# Patient Record
Sex: MALE | Race: WHITE | NOT HISPANIC OR LATINO | Employment: UNEMPLOYED | URBAN - METROPOLITAN AREA
[De-identification: names, ages, dates, MRNs, and addresses within clinical notes are randomized per-mention and may not be internally consistent; named-entity substitution may affect disease eponyms.]

---

## 2018-11-02 ENCOUNTER — APPOINTMENT (OUTPATIENT)
Dept: CT IMAGING | Facility: HOSPITAL | Age: 28
End: 2018-11-02

## 2018-11-02 ENCOUNTER — HOSPITAL ENCOUNTER (EMERGENCY)
Facility: HOSPITAL | Age: 28
Discharge: HOME | End: 2018-11-02
Attending: EMERGENCY MEDICINE | Admitting: EMERGENCY MEDICINE

## 2018-11-02 VITALS
DIASTOLIC BLOOD PRESSURE: 100 MMHG | HEIGHT: 63 IN | TEMPERATURE: 97.4 F | WEIGHT: 115 LBS | HEART RATE: 85 BPM | OXYGEN SATURATION: 99 % | BODY MASS INDEX: 20.38 KG/M2 | RESPIRATION RATE: 20 BRPM | SYSTOLIC BLOOD PRESSURE: 155 MMHG

## 2018-11-02 DIAGNOSIS — S00.83XA FACIAL CONTUSION, INITIAL ENCOUNTER: ICD-10-CM
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
## 2018-11-02 PROCEDURE — 70486 CT MAXILLOFACIAL W/O DYE: CPT

## 2018-11-02 PROCEDURE — 70450 CT HEAD/BRAIN W/O DYE: CPT

## 2018-11-02 PROCEDURE — 72125 CT NECK SPINE W/O DYE: CPT

## 2018-11-02 PROCEDURE — 99283 EMERGENCY DEPT VISIT LOW MDM: CPT
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
## 2023-06-07 ENCOUNTER — OFFICE VISIT (OUTPATIENT)
Dept: FAMILY MEDICINE CLINIC | Facility: CLINIC | Age: 33
End: 2023-06-07
Payer: MEDICAID

## 2023-06-07 VITALS
TEMPERATURE: 98.4 F | WEIGHT: 114.6 LBS | HEIGHT: 64 IN | DIASTOLIC BLOOD PRESSURE: 66 MMHG | HEART RATE: 82 BPM | SYSTOLIC BLOOD PRESSURE: 92 MMHG | BODY MASS INDEX: 19.56 KG/M2

## 2023-06-07 DIAGNOSIS — Z86.2 HISTORY OF IMMUNODEFICIENCY: ICD-10-CM

## 2023-06-07 DIAGNOSIS — Z86.39 HISTORY OF HYPERTHYROIDISM: Primary | ICD-10-CM

## 2023-06-07 DIAGNOSIS — Q90.9 DOWN SYNDROME: ICD-10-CM

## 2023-06-07 PROBLEM — M25.559 HIP PAIN: Status: ACTIVE | Noted: 2022-12-15

## 2023-06-07 PROBLEM — Z98.890 HISTORY OF STRABISMUS SURGERY: Status: ACTIVE | Noted: 2023-03-01

## 2023-06-07 PROBLEM — E55.9 VITAMIN D DEFICIENCY: Status: ACTIVE | Noted: 2020-03-09

## 2023-06-07 PROBLEM — R05.1 ACUTE COUGH: Status: ACTIVE | Noted: 2023-02-14

## 2023-06-07 PROBLEM — S63.006A: Status: ACTIVE | Noted: 2021-07-27

## 2023-06-07 PROBLEM — Q65.89 OTHER SPECIFIED CONGENITAL DEFORMITIES OF HIP: Status: ACTIVE | Noted: 2021-07-27

## 2023-06-07 PROBLEM — H50.9 STRABISMUS: Status: ACTIVE | Noted: 2023-03-01

## 2023-06-07 PROBLEM — H50.05 ESOTROPIA, ALTERNATING: Status: ACTIVE | Noted: 2023-03-01

## 2023-06-07 NOTE — PROGRESS NOTES
New Patient Office Visit      Patient Name: Juanito Fernández  : 1990   MRN: [id number]     Chief Complaint:    Chief Complaint   Patient presents with    Thyroid Problem     Establish care.       History of Present Illness: Juanito Fernández is a 33 y.o. male who is here today to establish care.  Patient is here with his mother who helps with history.  Patient has Down syndrome.  Patient also has a history of hypothyroidism.  Most recently he had eye surgery, and in his remote past he has had mouth reconstruction.  He does have allergies to latex and sulfa drugs.  These allergies are mild.  Needs an annual exam later this year.  Of note, mother does report history of immunodeficiency.    Patient enjoys bowling.  Seems to be active doing different activities and has a good support system.      Physical exam: Patient's lung exam CTA bilaterally.  Exam RRR.  No lower extremity edema.  Mood and affect appropriate    Subjective          Past Medical History:   Past Medical History:   Diagnosis Date    Disease of thyroid gland     Down syndrome        Past Surgical History:   Past Surgical History:   Procedure Laterality Date    EYE SURGERY      MOUTH SURGERY      reconstruction       Family History: History reviewed. No pertinent family history.    Social History:   Social History     Socioeconomic History    Marital status: Single   Tobacco Use    Smoking status: Never    Smokeless tobacco: Never   Substance and Sexual Activity    Alcohol use: No    Drug use: No       Medications:     Current Outpatient Medications:     cetirizine (zyrTEC) 10 MG tablet, Take 10 mg by mouth Daily., Disp: , Rfl:     flintstones complete (FLINTSTONES) 60 MG chewable tablet, Chew 1 tablet Daily., Disp: , Rfl:     Allergies:   Allergies   Allergen Reactions    Sulfa Antibiotics Anaphylaxis    Adhesive Tape Other (See Comments)    Latex Other (See Comments) and Unknown (See Comments)     red blisters       Objective     Physical Exam: Please  "see above  Vital Signs:   Vitals:    06/07/23 0847   BP: 92/66   Pulse: 82   Temp: 98.4 °F (36.9 °C)   Weight: 52 kg (114 lb 9.6 oz)   Height: 162 cm (63.78\")     Body mass index is 19.81 kg/m².       Assessment / Plan      Assessment/Plan:   Diagnoses and all orders for this visit:    1. History of hyperthyroidism (Primary)    2. Down syndrome    3. History of immunodeficiency         For patient's hyperthyroidism, we can recheck at his next physical.  Monitor for signs and symptoms going forward.  Immunodeficiency history-will recheck electrophoresis and immunoglobulins at next physical.  Down syndrome-has good support system.  Seems to be staying active doing different things.  We will continue monitor for any social needs.      Follow Up:   Return for Annual.    Semaj Leal DO  [facility] [place]     "

## 2023-10-17 ENCOUNTER — FLU SHOT (OUTPATIENT)
Dept: FAMILY MEDICINE CLINIC | Facility: CLINIC | Age: 33
End: 2023-10-17
Payer: MEDICAID

## 2023-10-17 DIAGNOSIS — Z23 NEED FOR INFLUENZA VACCINATION: Primary | ICD-10-CM

## 2023-10-17 PROCEDURE — 90686 IIV4 VACC NO PRSV 0.5 ML IM: CPT | Performed by: FAMILY MEDICINE

## 2023-10-17 PROCEDURE — 90471 IMMUNIZATION ADMIN: CPT | Performed by: FAMILY MEDICINE

## 2023-11-10 ENCOUNTER — LAB (OUTPATIENT)
Dept: LAB | Facility: HOSPITAL | Age: 33
End: 2023-11-10
Payer: MEDICAID

## 2023-11-10 ENCOUNTER — OFFICE VISIT (OUTPATIENT)
Dept: FAMILY MEDICINE CLINIC | Facility: CLINIC | Age: 33
End: 2023-11-10
Payer: MEDICAID

## 2023-11-10 ENCOUNTER — TELEPHONE (OUTPATIENT)
Dept: FAMILY MEDICINE CLINIC | Facility: CLINIC | Age: 33
End: 2023-11-10

## 2023-11-10 VITALS
BODY MASS INDEX: 21 KG/M2 | DIASTOLIC BLOOD PRESSURE: 64 MMHG | WEIGHT: 123 LBS | HEIGHT: 64 IN | HEART RATE: 65 BPM | OXYGEN SATURATION: 98 % | TEMPERATURE: 98.7 F | SYSTOLIC BLOOD PRESSURE: 100 MMHG

## 2023-11-10 DIAGNOSIS — M21.619 BUNION: ICD-10-CM

## 2023-11-10 DIAGNOSIS — B35.1 ONYCHOMYCOSIS: ICD-10-CM

## 2023-11-10 DIAGNOSIS — Z00.00 ANNUAL PHYSICAL EXAM: Primary | ICD-10-CM

## 2023-11-10 DIAGNOSIS — H61.23 BILATERAL IMPACTED CERUMEN: ICD-10-CM

## 2023-11-10 DIAGNOSIS — Z00.00 ANNUAL PHYSICAL EXAM: ICD-10-CM

## 2023-11-10 LAB
ALBUMIN SERPL-MCNC: 4.5 G/DL (ref 3.5–5.2)
ALBUMIN/GLOB SERPL: 1.9 G/DL
ALP SERPL-CCNC: 69 U/L (ref 39–117)
ALT SERPL W P-5'-P-CCNC: 32 U/L (ref 1–41)
ANION GAP SERPL CALCULATED.3IONS-SCNC: 7 MMOL/L (ref 5–15)
AST SERPL-CCNC: 54 U/L (ref 1–40)
BILIRUB SERPL-MCNC: 0.3 MG/DL (ref 0–1.2)
BUN SERPL-MCNC: 24 MG/DL (ref 6–20)
BUN/CREAT SERPL: 24.7 (ref 7–25)
CALCIUM SPEC-SCNC: 9.5 MG/DL (ref 8.6–10.5)
CHLORIDE SERPL-SCNC: 102 MMOL/L (ref 98–107)
CHOLEST SERPL-MCNC: 197 MG/DL (ref 0–200)
CO2 SERPL-SCNC: 30 MMOL/L (ref 22–29)
CREAT SERPL-MCNC: 0.97 MG/DL (ref 0.76–1.27)
EGFRCR SERPLBLD CKD-EPI 2021: 107 ML/MIN/1.73
GLOBULIN UR ELPH-MCNC: 2.4 GM/DL
GLUCOSE SERPL-MCNC: 89 MG/DL (ref 65–99)
HDLC SERPL-MCNC: 70 MG/DL (ref 40–60)
LDLC SERPL CALC-MCNC: 100 MG/DL (ref 0–100)
LDLC/HDLC SERPL: 1.36 {RATIO}
POTASSIUM SERPL-SCNC: 4.3 MMOL/L (ref 3.5–5.2)
PROT SERPL-MCNC: 6.9 G/DL (ref 6–8.5)
SODIUM SERPL-SCNC: 139 MMOL/L (ref 136–145)
TRIGL SERPL-MCNC: 160 MG/DL (ref 0–150)
TSH SERPL DL<=0.05 MIU/L-ACNC: 4.52 UIU/ML (ref 0.27–4.2)
VLDLC SERPL-MCNC: 27 MG/DL (ref 5–40)

## 2023-11-10 PROCEDURE — 80061 LIPID PANEL: CPT

## 2023-11-10 PROCEDURE — 85025 COMPLETE CBC W/AUTO DIFF WBC: CPT

## 2023-11-10 PROCEDURE — 84443 ASSAY THYROID STIM HORMONE: CPT

## 2023-11-10 PROCEDURE — 80053 COMPREHEN METABOLIC PANEL: CPT

## 2023-11-10 PROCEDURE — 86803 HEPATITIS C AB TEST: CPT

## 2023-11-10 PROCEDURE — G0432 EIA HIV-1/HIV-2 SCREEN: HCPCS

## 2023-11-10 PROCEDURE — 84439 ASSAY OF FREE THYROXINE: CPT

## 2023-11-10 RX ORDER — FLUCONAZOLE 100 MG/1
100 TABLET ORAL DAILY
Qty: 4 TABLET | Refills: 5 | Status: SHIPPED | OUTPATIENT
Start: 2023-11-10 | End: 2023-11-13 | Stop reason: SDUPTHER

## 2023-11-10 NOTE — PROGRESS NOTES
Follow Up Office Visit      Patient Name: Juanito Fernández  : 1990   MRN: [id number]     Chief Complaint:    Chief Complaint   Patient presents with    Annual Exam       History of Present Illness: Juanito Fernández is a 33 y.o. male who is here today to follow up with hx of hyperthyroidism and he has down syndrome.  Patient needs repeat labs today.  Is doing well overall but does have onychomycosis and bunion on both feet.  Is told is not aligned very well.  No issues as far as pain or discomfort.  Patient is here with his mother who helps with some of the history.    No concern for diabetes and no history of diabetes.  Did have a history of thyroid dysfunction so we will need to test that today.  Patient feeling well overall.  He is physically active.  Patient is socially active.  No recent illnesses.  Takes his medication as prescribed.  He eats a healthy diet.  Follows up with a dentist as recommended.    Onychomycosis is a chronic concern an issue for the patient.  Bunion on both feet as well.  Feet are not aligned very well.  Chronic issue as well.      Physical exam: Patient's mood and affect was appropriate neurological exam grossly intact.  Heart exam RRR.  Lung exam CTA bilateral.  Muscle tone appropriate.  Bilateral cerumen impaction noted.        Subjective        I have reviewed and the following portions of the patient's history were updated as appropriate: past family history, past medical history, past social history, past surgical history and problem list.    Medications:     Current Outpatient Medications:     cetirizine (zyrTEC) 10 MG tablet, Take 1 tablet by mouth Daily., Disp: , Rfl:     flintstones complete (FLINTSTONES) 60 MG chewable tablet, Chew 1 tablet Daily., Disp: , Rfl:     fluconazole (Diflucan) 100 MG tablet, Take 1 tablet by mouth Daily., Disp: 4 tablet, Rfl: 5    Allergies:   Allergies   Allergen Reactions    Sulfa Antibiotics Anaphylaxis    Adhesive Tape Other (See Comments)     "Latex Other (See Comments) and Unknown (See Comments)     red blisters       Objective     Physical Exam: Please see above  Vital Signs:   Vitals:    11/10/23 1500   BP: 100/64   Pulse: 65   Temp: 98.7 °F (37.1 °C)   SpO2: 98%   Weight: 55.8 kg (123 lb)   Height: 161.3 cm (63.5\")     Body mass index is 21.45 kg/m².          Assessment / Plan      Assessment/Plan:   Diagnoses and all orders for this visit:    1. Annual physical exam (Primary)  -     CBC & Differential; Future  -     Comprehensive Metabolic Panel; Future  -     Lipid Panel; Future  -     TSH Rfx On Abnormal To Free T4; Future  -     Hepatitis C Antibody; Future  -     HIV-1 / O / 2 Ag / Antibody; Future    2. Bunion  -     Ambulatory Referral to Podiatry    3. Onychomycosis  -     fluconazole (Diflucan) 100 MG tablet; Take 1 tablet by mouth Daily.  Dispense: 4 tablet; Refill: 5    4. Bilateral impacted cerumen  -     Ear Cerumen Removal    For bunions-we will send patient to podiatry.  Patient is not having a lot of pain but in the future he may have rubbing that occurs and may cause a sore.  He may also develop pain.  So the main reason for sending to podiatry is for preventative course of action to help treat his bunions and the abnormal alignment of his toes.    Onychomycosis-we will treat with Diflucan weekly for 6 months.  If no improvement will treat with Lamisil.  Will need liver testing with Lamisil treatment    Cerumen impaction noted on exam.    Annual exam counseling: Constipation regards to diet, exercise, dental care and vision care.  Recommend regular exercise and incorporating resistance training.  Patient has a good regimen and goes to the gym regularly.  Patient reports a healthy diet.  Recommend abundance of whole grains and vegetables.  For Down syndrome recommend monitoring thyroid function and blood sugar yearly.  Watch for any signs of cardiovascular disease.    Age 42 we will start screening for dementia.    Treatment for " chronic condition of onychomycosis and bunion discussed.  Level 4 visit assessed in addition to annual exam    Ear Cerumen Removal    Date/Time: 11/10/2023 4:04 PM    Performed by: Semaj Leal DO  Authorized by: Semaj Leal DO  Consent: Verbal consent obtained. Written consent not obtained.  Risks and benefits: risks, benefits and alternatives were discussed  Consent given by: patient  Patient understanding: patient states understanding of the procedure being performed  Patient identity confirmed: verbally with patient    Anesthesia:  Local Anesthetic: none  Location details: right ear and left ear  Patient tolerance: patient tolerated the procedure well with no immediate complications  Procedure type: irrigation   Sedation:  Patient sedated: no             Follow Up:   Return in about 6 months (around 5/10/2024) for Recheck, Labs today.    Semaj Leal DO  [facility] [place]

## 2023-11-10 NOTE — TELEPHONE ENCOUNTER
Caller: Annie Fernández    Relationship: Mother    Best call back number: [phone]    What is the best time to reach you: ANYTIME    Who are you requesting to speak with (clinical staff, provider,  specific staff member): CLINCAL OR PROVIDER    Do you know the name of the person who called: NA    What was the call regarding: PATIENT MOTHER CALLED AND IS NEEDING CLARIFICATION OF MEDICATION THAT WAS CALL INTO THE PHARMACY FOR PATIENT. FLUCONAZOLE. PLEASE CALL TO DISCUSS SHE THINKS PRESCRIPTION WAS WRITTEN DIFFERENT THEN WAS DISCUSSED IN OFFICE.    Is it okay if the provider responds through MyChart: NO

## 2023-11-11 LAB
BASOPHILS # BLD AUTO: 0.04 10*3/MM3 (ref 0–0.2)
BASOPHILS NFR BLD AUTO: 0.7 % (ref 0–1.5)
DEPRECATED RDW RBC AUTO: 44.5 FL (ref 37–54)
EOSINOPHIL # BLD AUTO: 0.03 10*3/MM3 (ref 0–0.4)
EOSINOPHIL NFR BLD AUTO: 0.5 % (ref 0.3–6.2)
ERYTHROCYTE [DISTWIDTH] IN BLOOD BY AUTOMATED COUNT: 12.8 % (ref 12.3–15.4)
HCT VFR BLD AUTO: 41.1 % (ref 37.5–51)
HCV AB SER DONR QL: NORMAL
HGB BLD-MCNC: 14.4 G/DL (ref 13–17.7)
HIV 1+2 AB+HIV1 P24 AG SERPL QL IA: NORMAL
IMM GRANULOCYTES # BLD AUTO: 0.02 10*3/MM3 (ref 0–0.05)
IMM GRANULOCYTES NFR BLD AUTO: 0.4 % (ref 0–0.5)
LYMPHOCYTES # BLD AUTO: 1.46 10*3/MM3 (ref 0.7–3.1)
LYMPHOCYTES NFR BLD AUTO: 26 % (ref 19.6–45.3)
MCH RBC QN AUTO: 33.1 PG (ref 26.6–33)
MCHC RBC AUTO-ENTMCNC: 35 G/DL (ref 31.5–35.7)
MCV RBC AUTO: 94.5 FL (ref 79–97)
MONOCYTES # BLD AUTO: 0.5 10*3/MM3 (ref 0.1–0.9)
MONOCYTES NFR BLD AUTO: 8.9 % (ref 5–12)
NEUTROPHILS NFR BLD AUTO: 3.57 10*3/MM3 (ref 1.7–7)
NEUTROPHILS NFR BLD AUTO: 63.5 % (ref 42.7–76)
NRBC BLD AUTO-RTO: 0 /100 WBC (ref 0–0.2)
PLATELET # BLD AUTO: 168 10*3/MM3 (ref 140–450)
PMV BLD AUTO: 10.6 FL (ref 6–12)
RBC # BLD AUTO: 4.35 10*6/MM3 (ref 4.14–5.8)
T4 FREE SERPL-MCNC: 1.14 NG/DL (ref 0.93–1.7)
WBC NRBC COR # BLD: 5.62 10*3/MM3 (ref 3.4–10.8)

## 2023-11-13 DIAGNOSIS — B35.1 ONYCHOMYCOSIS: ICD-10-CM

## 2023-11-13 RX ORDER — FLUCONAZOLE 100 MG/1
100 TABLET ORAL WEEKLY
Qty: 4 TABLET | Refills: 5 | Status: SHIPPED | OUTPATIENT
Start: 2023-11-13

## 2023-11-14 ENCOUNTER — TELEPHONE (OUTPATIENT)
Dept: FAMILY MEDICINE CLINIC | Facility: CLINIC | Age: 33
End: 2023-11-14

## 2023-11-14 ENCOUNTER — DOCUMENTATION (OUTPATIENT)
Dept: FAMILY MEDICINE CLINIC | Facility: CLINIC | Age: 33
End: 2023-11-14
Payer: MEDICAID

## 2023-11-14 NOTE — TELEPHONE ENCOUNTER
Caller: Annie Fernández    Relationship to patient: Mother    Best call back number: [phone]     Date of exposure:  11/10/23    Date of positive COVID19 test: 11/14/23    COVID19 symptoms: SLIGHT FEVER, 99.7 RUNNY NOSE    Date of initial quarantine: [id number]    Additional information or concerns: ASKING FOR A PRESCRIPTION OR DO YOU WANT TO SEE HIM?    What is the patients preferred pharmacy: [facility] [id number] - [address] [facility] - [phone] [facility] [phone]  [phone]     CALL [facility], HE HAS A COMPROMISED IMMUNITY  AND DOWN'S SYNDROME.    WOULD LIKE TO KNOW SOMETHING TODAY [phone]

## 2023-11-15 NOTE — PROGRESS NOTES
Both parents called twice this evening to let me know that patient tested positive for COVID today. Today is day 2 of symptoms. He has fever and congestion, as well as poor appetite. He is high risk given that he Downs syndrome. I discussed that I would go ahead and send in Paxlovid this time, but in the future, for any prescription medications, he would need an appt. Risks and benefits discussed. Pt has tolerated this  medication in the past.

## 2024-09-06 ENCOUNTER — LAB (OUTPATIENT)
Dept: INTERNAL MEDICINE | Facility: CLINIC | Age: 34
End: 2024-09-06
Payer: MEDICAID

## 2024-09-06 ENCOUNTER — OFFICE VISIT (OUTPATIENT)
Dept: INTERNAL MEDICINE | Facility: CLINIC | Age: 34
End: 2024-09-06
Payer: MEDICAID

## 2024-09-06 VITALS
HEIGHT: 64 IN | TEMPERATURE: 98.4 F | SYSTOLIC BLOOD PRESSURE: 112 MMHG | WEIGHT: 119 LBS | BODY MASS INDEX: 20.32 KG/M2 | RESPIRATION RATE: 16 BRPM | HEART RATE: 56 BPM | DIASTOLIC BLOOD PRESSURE: 70 MMHG | OXYGEN SATURATION: 99 %

## 2024-09-06 DIAGNOSIS — E03.8 SUBCLINICAL HYPOTHYROIDISM: ICD-10-CM

## 2024-09-06 DIAGNOSIS — Z76.89 ENCOUNTER TO ESTABLISH CARE WITH NEW DOCTOR: Primary | ICD-10-CM

## 2024-09-06 DIAGNOSIS — E05.00 GRAVES DISEASE: Primary | ICD-10-CM

## 2024-09-06 LAB — TSH SERPL DL<=0.05 MIU/L-ACNC: 2.54 UIU/ML (ref 0.27–4.2)

## 2024-09-06 PROCEDURE — 99213 OFFICE O/P EST LOW 20 MIN: CPT | Performed by: INTERNAL MEDICINE

## 2024-09-06 PROCEDURE — 36415 COLL VENOUS BLD VENIPUNCTURE: CPT | Performed by: INTERNAL MEDICINE

## 2024-09-06 PROCEDURE — 84443 ASSAY THYROID STIM HORMONE: CPT | Performed by: INTERNAL MEDICINE

## 2024-09-06 NOTE — PROGRESS NOTES
New Patient Office Visit      Date: 2024  Patient Name: Juanito Fernández  : 1990   MRN: [id number]     Chief Complaint:    Chief Complaint   Patient presents with    Establish Care    Annual Exam    Hip Pain     Hx of hip dysplasia       History of Present Illness: Juanito Fernández is a 34 y.o. male who is here today to establish care and for follow up on down syndrome, subclinical hypothyroidism.  Patient with hip dysplasia, unclear which one.  Worse with walking long distances. Naproxen prn for pain.  Mostly asymptomatic during daily life because he is less active. Previously had physical therapy for it last year.  Has seen ortho in past and waiting on surgery.         Subjective      Review of Systems:   Review of Systems    Past Medical History:   Past Medical History:   Diagnosis Date    Arthritis     Disease of thyroid gland     Down syndrome     Hip dysplasia, congenital     Hyperthyroidism        Past Surgical History:   Past Surgical History:   Procedure Laterality Date    EAR TUBES Bilateral     HAS HAD 5 TIMES    EYE SURGERY      EYE SURGERY      HA D 2 EYE ALIGHMENTS, SECOND WAS     MOUTH SURGERY      reconstruction    MOUTH SURGERY      RECONSTRUCTION OF MOUTH    TONSILECTOMY, ADENOIDECTOMY, BILATERAL MYRINGOTOMY AND TUBES         Family History:   Family History   Problem Relation Age of Onset    Arthritis Mother     Cancer Mother     Hyperlipidemia Mother     Osteoporosis Mother     Cancer Father     Hypertension Father     Arthritis Maternal Grandmother     Hypertension Maternal Grandmother     Diabetes Maternal Grandfather     Kidney disease Maternal Grandfather        Social History:   Social History     Socioeconomic History    Marital status: Single   Tobacco Use    Smoking status: Never     Passive exposure: Never    Smokeless tobacco: Never   Vaping Use    Vaping status: Never Used   Substance and Sexual Activity    Alcohol use: No    Drug use: No    Sexual activity:  "Never       Medications:     Current Outpatient Medications:     cetirizine (zyrTEC) 10 MG tablet, Take 1 tablet by mouth Daily., Disp: , Rfl:     flintstones complete (FLINTSTONES) 60 MG chewable tablet, Chew 1 tablet Daily., Disp: , Rfl:     Allergies:   Allergies   Allergen Reactions    Sulfa Antibiotics Anaphylaxis    Adhesive Tape Other (See Comments)    Latex Other (See Comments) and Unknown (See Comments)     red blisters       Objective     Physical Exam:  Vital Signs:   Vitals:    09/06/24 1452   BP: 112/70   BP Location: Left arm   Patient Position: Sitting   Cuff Size: Adult   Pulse: 56   Resp: 16   Temp: 98.4 °F (36.9 °C)   TempSrc: Tympanic   SpO2: 99%   Weight: 54 kg (119 lb)   Height: 161.3 cm (63.5\")     Body mass index is 20.75 kg/m².   BMI is within normal parameters. No other follow-up for BMI required.       Physical Exam  Constitutional:       General: He is not in acute distress.     Appearance: Normal appearance. He is not ill-appearing.   HENT:      Nose: No congestion or rhinorrhea.      Mouth/Throat:      Mouth: Mucous membranes are moist.      Pharynx: No oropharyngeal exudate.   Eyes:      Extraocular Movements: Extraocular movements intact.      Conjunctiva/sclera: Conjunctivae normal.      Pupils: Pupils are equal, round, and reactive to light.   Cardiovascular:      Rate and Rhythm: Normal rate and regular rhythm.      Heart sounds: No murmur heard.  Pulmonary:      Effort: Pulmonary effort is normal. No respiratory distress.   Abdominal:      General: Abdomen is flat. Bowel sounds are normal.      Palpations: Abdomen is soft.      Tenderness: There is no abdominal tenderness.   Musculoskeletal:      Right lower leg: No edema.      Left lower leg: No edema.   Skin:     General: Skin is warm and dry.      Findings: No rash.   Neurological:      General: No focal deficit present.      Mental Status: He is alert and oriented to person, place, and time. Mental status is at baseline. "   Psychiatric:         Mood and Affect: Mood normal.         Behavior: Behavior normal.         POCT Results (if applicable):   Results for orders placed or performed in visit on 11/10/23   Comprehensive Metabolic Panel    Specimen: Blood   Result Value Ref Range    Glucose 89 65 - 99 mg/dL    BUN 24 (H) 6 - 20 mg/dL    Creatinine 0.97 0.76 - 1.27 mg/dL    Sodium 139 136 - 145 mmol/L    Potassium 4.3 3.5 - 5.2 mmol/L    Chloride 102 98 - 107 mmol/L    CO2 30.0 (H) 22.0 - 29.0 mmol/L    Calcium 9.5 8.6 - 10.5 mg/dL    Total Protein 6.9 6.0 - 8.5 g/dL    Albumin 4.5 3.5 - 5.2 g/dL    ALT (SGPT) 32 1 - 41 U/L    AST (SGOT) 54 (H) 1 - 40 U/L    Alkaline Phosphatase 69 39 - 117 U/L    Total Bilirubin 0.3 0.0 - 1.2 mg/dL    Globulin 2.4 gm/dL    A/G Ratio 1.9 g/dL    BUN/Creatinine Ratio 24.7 7.0 - 25.0    Anion Gap 7.0 5.0 - 15.0 mmol/L    eGFR 107.0 >60.0 mL/min/1.73   Lipid Panel    Specimen: Blood   Result Value Ref Range    Total Cholesterol 197 0 - 200 mg/dL    Triglycerides 160 (H) 0 - 150 mg/dL    HDL Cholesterol 70 (H) 40 - 60 mg/dL    LDL Cholesterol  100 0 - 100 mg/dL    VLDL Cholesterol 27 5 - 40 mg/dL    LDL/HDL Ratio 1.36    TSH Rfx On Abnormal To Free T4    Specimen: Blood   Result Value Ref Range    TSH 4.520 (H) 0.270 - 4.200 uIU/mL   Hepatitis C Antibody    Specimen: Blood   Result Value Ref Range    Hepatitis C Ab Non-Reactive Non-Reactive   HIV-1 / O / 2 Ag / Antibody    Specimen: Blood   Result Value Ref Range    HIV DUO Non-Reactive Non-Reactive   CBC Auto Differential    Specimen: Blood   Result Value Ref Range    WBC 5.62 3.40 - 10.80 10*3/mm3    RBC 4.35 4.14 - 5.80 10*6/mm3    Hemoglobin 14.4 13.0 - 17.7 g/dL    Hematocrit 41.1 37.5 - 51.0 %    MCV 94.5 79.0 - 97.0 fL    MCH 33.1 (H) 26.6 - 33.0 pg    MCHC 35.0 31.5 - 35.7 g/dL    RDW 12.8 12.3 - 15.4 %    RDW-SD 44.5 37.0 - 54.0 fl    MPV 10.6 6.0 - 12.0 fL    Platelets 168 140 - 450 10*3/mm3    Neutrophil % 63.5 42.7 - 76.0 %    Lymphocyte %  26.0 19.6 - 45.3 %    Monocyte % 8.9 5.0 - 12.0 %    Eosinophil % 0.5 0.3 - 6.2 %    Basophil % 0.7 0.0 - 1.5 %    Immature Grans % 0.4 0.0 - 0.5 %    Neutrophils, Absolute 3.57 1.70 - 7.00 10*3/mm3    Lymphocytes, Absolute 1.46 0.70 - 3.10 10*3/mm3    Monocytes, Absolute 0.50 0.10 - 0.90 10*3/mm3    Eosinophils, Absolute 0.03 0.00 - 0.40 10*3/mm3    Basophils, Absolute 0.04 0.00 - 0.20 10*3/mm3    Immature Grans, Absolute 0.02 0.00 - 0.05 10*3/mm3    nRBC 0.0 0.0 - 0.2 /100 WBC   T4, Free    Specimen: Blood   Result Value Ref Range    Free T4 1.14 0.93 - 1.70 ng/dL       Measures:   Advanced Care Planning:   NA    Smoking Cessation:   NA    BMI is within normal parameters. No other follow-up for BMI required.       Assessment / Plan      Assessment/Plan:   Diagnoses and all orders for this visit:    1. Encounter to establish care with new doctor (Primary)    2. Subclinical hypothyroidism  -     TSH Rfx On Abnormal To Free T4    Thyroid peroxidase antibody positive in 2017 and 2018.  Possibly with Hashimoto's disease        Vaccine Counseling:      Follow Up:   No follow-ups on file.    At [facility], we believe that sharing information builds trust and better relationships. You are receiving this note because you recently visited [facility]. It is possible you will see health information before a provider has talked with you about it. This kind of information can be easy to misunderstand. To help you fully understand what it means for your health, we urge you to discuss this note with your provider.    Cuba Hwang DO    [facility]

## 2024-11-05 ENCOUNTER — FLU SHOT (OUTPATIENT)
Dept: INTERNAL MEDICINE | Facility: CLINIC | Age: 34
End: 2024-11-05
Payer: MEDICAID

## 2024-11-05 DIAGNOSIS — Z23 NEED FOR INFLUENZA VACCINATION: Primary | ICD-10-CM

## 2024-11-05 PROCEDURE — 90471 IMMUNIZATION ADMIN: CPT | Performed by: INTERNAL MEDICINE

## 2024-11-05 PROCEDURE — 90656 IIV3 VACC NO PRSV 0.5 ML IM: CPT | Performed by: INTERNAL MEDICINE

## 2025-06-26 ENCOUNTER — OFFICE VISIT (OUTPATIENT)
Dept: INTERNAL MEDICINE | Age: 35
End: 2025-06-26
Payer: MEDICAID

## 2025-06-26 ENCOUNTER — LAB (OUTPATIENT)
Dept: INTERNAL MEDICINE | Age: 35
End: 2025-06-26
Payer: MEDICAID

## 2025-06-26 VITALS
HEART RATE: 63 BPM | OXYGEN SATURATION: 97 % | WEIGHT: 121.8 LBS | HEIGHT: 64 IN | SYSTOLIC BLOOD PRESSURE: 98 MMHG | TEMPERATURE: 96.9 F | BODY MASS INDEX: 20.79 KG/M2 | DIASTOLIC BLOOD PRESSURE: 64 MMHG | RESPIRATION RATE: 16 BRPM

## 2025-06-26 DIAGNOSIS — Z00.00 HEALTHCARE MAINTENANCE: Primary | ICD-10-CM

## 2025-06-26 DIAGNOSIS — Z00.00 ANNUAL PHYSICAL EXAM: ICD-10-CM

## 2025-06-26 DIAGNOSIS — Z12.5 SCREENING FOR PROSTATE CANCER: ICD-10-CM

## 2025-06-26 DIAGNOSIS — Q65.89 CONGENITAL DYSPLASIA OF RIGHT HIP: Primary | ICD-10-CM

## 2025-06-26 DIAGNOSIS — E55.9 VITAMIN D DEFICIENCY: ICD-10-CM

## 2025-06-26 LAB
ALBUMIN SERPL-MCNC: 4.2 G/DL (ref 3.5–5.2)
ALBUMIN/GLOB SERPL: 1.4 G/DL
ALP SERPL-CCNC: 73 U/L (ref 39–117)
ALT SERPL W P-5'-P-CCNC: 40 U/L (ref 1–41)
ANION GAP SERPL CALCULATED.3IONS-SCNC: 8.9 MMOL/L (ref 5–15)
AST SERPL-CCNC: 66 U/L (ref 1–40)
BASOPHILS # BLD AUTO: 0.05 10*3/MM3 (ref 0–0.2)
BASOPHILS NFR BLD AUTO: 1 % (ref 0–1.5)
BILIRUB SERPL-MCNC: 0.3 MG/DL (ref 0–1.2)
BUN SERPL-MCNC: 23 MG/DL (ref 6–20)
BUN/CREAT SERPL: 22.8 (ref 7–25)
CALCIUM SPEC-SCNC: 9.3 MG/DL (ref 8.6–10.5)
CHLORIDE SERPL-SCNC: 105 MMOL/L (ref 98–107)
CHOLEST SERPL-MCNC: 183 MG/DL (ref 0–200)
CO2 SERPL-SCNC: 27.1 MMOL/L (ref 22–29)
CREAT SERPL-MCNC: 1.01 MG/DL (ref 0.76–1.27)
DEPRECATED RDW RBC AUTO: 45.7 FL (ref 37–54)
EGFRCR SERPLBLD CKD-EPI 2021: 100.7 ML/MIN/1.73
EOSINOPHIL # BLD AUTO: 0.03 10*3/MM3 (ref 0–0.4)
EOSINOPHIL NFR BLD AUTO: 0.6 % (ref 0.3–6.2)
ERYTHROCYTE [DISTWIDTH] IN BLOOD BY AUTOMATED COUNT: 12.7 % (ref 12.3–15.4)
GLOBULIN UR ELPH-MCNC: 3 GM/DL
GLUCOSE SERPL-MCNC: 88 MG/DL (ref 65–99)
HBA1C MFR BLD: 5.3 % (ref 4.8–5.6)
HCT VFR BLD AUTO: 41.9 % (ref 37.5–51)
HDLC SERPL-MCNC: 68 MG/DL (ref 40–60)
HGB BLD-MCNC: 14.3 G/DL (ref 13–17.7)
IMM GRANULOCYTES # BLD AUTO: 0.01 10*3/MM3 (ref 0–0.05)
IMM GRANULOCYTES NFR BLD AUTO: 0.2 % (ref 0–0.5)
LDLC SERPL CALC-MCNC: 102 MG/DL (ref 0–100)
LDLC/HDLC SERPL: 1.49 {RATIO}
LYMPHOCYTES # BLD AUTO: 1.56 10*3/MM3 (ref 0.7–3.1)
LYMPHOCYTES NFR BLD AUTO: 30.4 % (ref 19.6–45.3)
MCH RBC QN AUTO: 32.9 PG (ref 26.6–33)
MCHC RBC AUTO-ENTMCNC: 34.1 G/DL (ref 31.5–35.7)
MCV RBC AUTO: 96.5 FL (ref 79–97)
MONOCYTES # BLD AUTO: 0.57 10*3/MM3 (ref 0.1–0.9)
MONOCYTES NFR BLD AUTO: 11.1 % (ref 5–12)
NEUTROPHILS NFR BLD AUTO: 2.92 10*3/MM3 (ref 1.7–7)
NEUTROPHILS NFR BLD AUTO: 56.7 % (ref 42.7–76)
NRBC BLD AUTO-RTO: 0 /100 WBC (ref 0–0.2)
PLATELET # BLD AUTO: 160 10*3/MM3 (ref 140–450)
PMV BLD AUTO: 10.2 FL (ref 6–12)
POTASSIUM SERPL-SCNC: 4.6 MMOL/L (ref 3.5–5.2)
PROT SERPL-MCNC: 7.2 G/DL (ref 6–8.5)
RBC # BLD AUTO: 4.34 10*6/MM3 (ref 4.14–5.8)
SODIUM SERPL-SCNC: 141 MMOL/L (ref 136–145)
TRIGL SERPL-MCNC: 70 MG/DL (ref 0–150)
TSH SERPL DL<=0.05 MIU/L-ACNC: 5.39 UIU/ML (ref 0.27–4.2)
VLDLC SERPL-MCNC: 13 MG/DL (ref 5–40)
WBC NRBC COR # BLD AUTO: 5.14 10*3/MM3 (ref 3.4–10.8)

## 2025-06-26 PROCEDURE — 85025 COMPLETE CBC W/AUTO DIFF WBC: CPT | Performed by: INTERNAL MEDICINE

## 2025-06-26 PROCEDURE — 84443 ASSAY THYROID STIM HORMONE: CPT | Performed by: INTERNAL MEDICINE

## 2025-06-26 PROCEDURE — 80061 LIPID PANEL: CPT | Performed by: INTERNAL MEDICINE

## 2025-06-26 PROCEDURE — 84439 ASSAY OF FREE THYROXINE: CPT | Performed by: INTERNAL MEDICINE

## 2025-06-26 PROCEDURE — 80053 COMPREHEN METABOLIC PANEL: CPT | Performed by: INTERNAL MEDICINE

## 2025-06-26 PROCEDURE — 83036 HEMOGLOBIN GLYCOSYLATED A1C: CPT | Performed by: INTERNAL MEDICINE

## 2025-06-26 PROCEDURE — 36415 COLL VENOUS BLD VENIPUNCTURE: CPT | Performed by: INTERNAL MEDICINE

## 2025-06-26 NOTE — PROGRESS NOTES
Follow Up Office Visit      Date: 2025   Patient Name: Juanito Fernández  : 1990   MRN: [id number]     Chief Complaint:    Chief Complaint   Patient presents with    Annual Exam    Hip Pain       History of Present Illness: Juanito Fernández is a 35 y.o. male who is here today to follow up with right hip dysplasia. Mother has noticed that he slowed down, difficulty with uneven surfaces.  Was told he would need intervention in his 20s, but has done well until now.  Previously followed with [facility].  Mother would like to see someone with [facility] for second opinions.  Does not complain of pain, however mother reports he winces fairly often. PT did not help at all.        Subjective      Past Medical History:   Diagnosis Date    Arthritis     Disease of thyroid gland     Down syndrome     Hip dysplasia, congenital     Hyperthyroidism        Past Surgical History:   Procedure Laterality Date    EAR TUBES Bilateral     HAS HAD 5 TIMES    EYE SURGERY      EYE SURGERY      HA D 2 EYE ALIGHMENTS, SECOND WAS     MOUTH SURGERY      reconstruction    MOUTH SURGERY      RECONSTRUCTION OF MOUTH    TONSILECTOMY, ADENOIDECTOMY, BILATERAL MYRINGOTOMY AND TUBES         Family History   Problem Relation Age of Onset    Arthritis Mother     Cancer Mother     Hyperlipidemia Mother     Osteoporosis Mother     Cancer Father     Hypertension Father     Arthritis Maternal Grandmother     Hypertension Maternal Grandmother     Diabetes Maternal Grandfather     Kidney disease Maternal Grandfather         Social History     Socioeconomic History    Marital status: Single   Tobacco Use    Smoking status: Never     Passive exposure: Never    Smokeless tobacco: Never   Vaping Use    Vaping status: Never Used   Substance and Sexual Activity    Alcohol use: No    Drug use: No    Sexual activity: Never         I have reviewed the patients family history, social history, past medical history, past surgical history and have  "updated it as appropriate.     Medications:     Current Outpatient Medications:     cetirizine (zyrTEC) 10 MG tablet, Take 1 tablet by mouth Daily., Disp: , Rfl:     flintstones complete (FLINTSTONES) 60 MG chewable tablet, Chew 1 tablet Daily., Disp: , Rfl:     Allergies:   Allergies   Allergen Reactions    Sulfa Antibiotics Anaphylaxis    Adhesive Tape Other (See Comments)    Latex Other (See Comments) and Unknown (See Comments)     red blisters       Objective       Vital Signs:   Vitals:    06/26/25 1529   BP: 98/64   BP Location: Left arm   Patient Position: Sitting   Cuff Size: Adult   Pulse: 63   Resp: 16   Temp: 96.9 °F (36.1 °C)   TempSrc: Infrared   SpO2: 97%   Weight: 55.2 kg (121 lb 12.8 oz)   Height: 161.3 cm (63.5\")     Body mass index is 21.23 kg/m².    Physical Exam:  Physical Exam  Constitutional:       General: He is not in acute distress.     Appearance: Normal appearance. He is not ill-appearing.   HENT:      Nose: No congestion or rhinorrhea.      Mouth/Throat:      Mouth: Mucous membranes are moist.      Pharynx: No oropharyngeal exudate.   Eyes:      Extraocular Movements: Extraocular movements intact.      Conjunctiva/sclera: Conjunctivae normal.      Pupils: Pupils are equal, round, and reactive to light.   Cardiovascular:      Rate and Rhythm: Normal rate and regular rhythm.      Heart sounds: No murmur heard.  Pulmonary:      Effort: Pulmonary effort is normal. No respiratory distress.   Abdominal:      General: Abdomen is flat. Bowel sounds are normal.      Palpations: Abdomen is soft.      Tenderness: There is no abdominal tenderness.   Musculoskeletal:      Right lower leg: No edema.      Left lower leg: No edema.   Skin:     General: Skin is warm and dry.      Findings: No rash.   Neurological:      General: No focal deficit present.      Mental Status: He is alert and oriented to person, place, and time. Mental status is at baseline.   Psychiatric:         Mood and Affect: Mood " normal.         Behavior: Behavior normal.         Procedures    Results:       Assessment / Plan      Assessment/Plan:   Diagnoses and all orders for this visit:    1. Congenital dysplasia of right hip (Primary)  -     Ambulatory Referral to Orthopedic Surgery  -     XR hip w or wo pelvis 4 view right; Future         BMI is within normal parameters. No other follow-up for BMI required.       Follow Up:   No follow-ups on file.    DO KARRI Cheng

## 2025-06-26 NOTE — PROGRESS NOTES
Male Physical Note      Date: 2025   Patient Name: Juanito Fernández  : 1990   MRN: [id number]     Chief Complaint:    Chief Complaint   Patient presents with    Annual Exam    Hip Pain       History of Present Illness: Juanito Fernández is a 35 y.o. male who is here today for their annual health maintenance and physical. Pt with congenital right hip dysplasia. Mother has noticed that he slowed down, difficulty with uneven surfaces.  Was told he would need intervention in his 20s, but has done well until now.  Previously followed with [facility].  Mother would like to see someone with [facility] for second opinions.  Does not complain of pain, however mother reports he winces fairly often. PT did not help at all.           Subjective      Review of Systems     I have reviewed the following portions of the patient's history and these were updated and discussed with the patient as appropriate: past family history, past medical history, past social history, past surgical history, and problem list.      Medications:     Current Outpatient Medications:     cetirizine (zyrTEC) 10 MG tablet, Take 1 tablet by mouth Daily., Disp: , Rfl:     flintstones complete (FLINTSTONES) 60 MG chewable tablet, Chew 1 tablet Daily., Disp: , Rfl:     Allergies:   Allergies   Allergen Reactions    Sulfa Antibiotics Anaphylaxis    Adhesive Tape Other (See Comments)    Latex Other (See Comments) and Unknown (See Comments)     red blisters       Immunizations:  Immunization History   Administered Date(s) Administered    COVID-19 (PFIZER) Purple Cap Monovalent 2021, 2021, 10/14/2021    Covid-19 (Pfizer) Gray Cap Monovalent 2022    Fluzone  >6mos 2024    Fluzone (or Fluarix & Flulaval for VFC) >6mos 10/18/2016, 10/07/2017, 10/12/2018, 10/15/2019, 10/13/2020, 10/17/2023    Influenza MDCK Quadrivalent with Preserative 10/26/2022    Influenza Seasonal Injectable 2013    Influenza, Unspecified 2011    Tdap  "07/30/2019      Colorectal Screening:     Last Completed Colonoscopy    This patient has no relevant Health Maintenance data.           Diet/Physical activity:avg          PHQ-9 Depression Screening  Little interest or pleasure in doing things? Not at all   Feeling down, depressed, or hopeless? Not at all   PHQ-2 Total Score 0   Trouble falling or staying asleep, or sleeping too much?     Feeling tired or having little energy?     Poor appetite or overeating?     Feeling bad about yourself - or that you are a failure or have let yourself or your family down?     Trouble concentrating on things, such as reading the newspaper or watching television?     Moving or speaking so slowly that other people could have noticed? Or the opposite - being so fidgety or restless that you have been moving around a lot more than usual?     Thoughts that you would be better off dead, or of hurting yourself in some way?     PHQ-9 Total Score     If you checked off any problems, how difficult have these problems made it for you to do your work, take care of things at home, or get along with other people? Not difficult at all       NADYA-7        Objective     Physical Exam:  Vital Signs:   Vitals:    06/26/25 1529   BP: 98/64   BP Location: Left arm   Patient Position: Sitting   Cuff Size: Adult   Pulse: 63   Resp: 16   Temp: 96.9 °F (36.1 °C)   TempSrc: Infrared   SpO2: 97%   Weight: 55.2 kg (121 lb 12.8 oz)   Height: 161.3 cm (63.5\")     Body mass index is 21.23 kg/m².     Physical Exam  Constitutional:       General: He is not in acute distress.     Appearance: Normal appearance. He is not ill-appearing.   HENT:      Right Ear: Tympanic membrane normal.      Left Ear: Tympanic membrane and ear canal normal.      Nose: No congestion or rhinorrhea.      Mouth/Throat:      Mouth: Mucous membranes are moist.      Pharynx: No oropharyngeal exudate.   Eyes:      Extraocular Movements: Extraocular movements intact.      Conjunctiva/sclera: " "Conjunctivae normal.      Pupils: Pupils are equal, round, and reactive to light.   Cardiovascular:      Rate and Rhythm: Normal rate and regular rhythm.      Heart sounds: No murmur heard.  Pulmonary:      Effort: Pulmonary effort is normal. No respiratory distress.   Abdominal:      General: Abdomen is flat. Bowel sounds are normal.      Palpations: Abdomen is soft.      Tenderness: There is no abdominal tenderness.   Genitourinary:     Penis: Normal.       Testes: Normal.   Musculoskeletal:      Right lower leg: No edema.      Left lower leg: No edema.   Skin:     General: Skin is warm and dry.      Findings: No rash.   Neurological:      General: No focal deficit present.      Mental Status: He is alert and oriented to person, place, and time. Mental status is at baseline.   Psychiatric:         Mood and Affect: Mood normal.         Behavior: Behavior normal.         Procedures    LABS: No results found for: \"HGBA1C\"  No results found for: \"MICROALBUR\", \"[id number]\"     Assessment / Plan      Assessment/Plan:   Diagnoses and all orders for this visit:    1. Congenital dysplasia of right hip (Primary)  -     Ambulatory Referral to Orthopedic Surgery  -     XR hip w or wo pelvis 4 view right; Future    2. Screening for prostate cancer    3. Annual physical exam  -     Hemoglobin A1c  -     Lipid Panel  -     Comprehensive Metabolic Panel  -     CBC & Differential  -     TSH Rfx On Abnormal To Free T4    4. Vitamin D deficiency         BMI is within normal parameters. No other follow-up for BMI required.       Follow Up:   No follow-ups on file.    Healthcare Maintenance:   Counseling provided on exercise, nutrition, age-appropriate screening test, and appropriate lab studies.   Juanito Fernández voices understanding and acceptance of this advice and will call back with any further questions or concerns. AVS with preventive healthcare tips printed for patient.     Reviewed the following with the patient: [employer] " educational material given to patient.      Cuba Hwang DO   [facility] [place] [employer]

## 2025-06-27 DIAGNOSIS — R74.8 ELEVATED LIVER ENZYMES: Primary | ICD-10-CM

## 2025-06-27 LAB — T4 FREE SERPL-MCNC: 1.15 NG/DL (ref 0.92–1.68)

## 2025-07-08 ENCOUNTER — TELEPHONE (OUTPATIENT)
Dept: ORTHOPEDIC SURGERY | Facility: CLINIC | Age: 35
End: 2025-07-08
Payer: MEDICAID

## 2025-07-08 NOTE — TELEPHONE ENCOUNTER
PER DR. SIMMONS - PATIENT NEEDS TO CONTINUE HIS CARE AT . CALLED PATIENT AND LVM.     *HUB OK TO RELAY*

## 2025-07-24 ENCOUNTER — TELEPHONE (OUTPATIENT)
Dept: INTERNAL MEDICINE | Age: 35
End: 2025-07-24
Payer: MEDICAID

## 2025-07-24 NOTE — TELEPHONE ENCOUNTER
Pts mother called the office to see what the hold up was for the pt\'s US. Informed her the pt is already scheduled for the u/s 8/8/2025 arriving at 9am for a 9:15am appt  at the [facility]. Mailed an appt reminder as well

## 2025-08-08 ENCOUNTER — LAB (OUTPATIENT)
Dept: INTERNAL MEDICINE | Age: 35
End: 2025-08-08
Payer: MEDICAID

## 2025-08-08 ENCOUNTER — HOSPITAL ENCOUNTER (OUTPATIENT)
Facility: HOSPITAL | Age: 35
Discharge: HOME | End: 2025-08-08
Payer: MEDICAID

## 2025-08-08 DIAGNOSIS — K76.0 FATTY LIVER DISEASE, NONALCOHOLIC: Primary | ICD-10-CM

## 2025-08-08 DIAGNOSIS — R74.8 ELEVATED LIVER ENZYMES: ICD-10-CM

## 2025-08-08 DIAGNOSIS — K76.0 FATTY LIVER DISEASE, NONALCOHOLIC: ICD-10-CM

## 2025-08-08 PROCEDURE — 83883 ASSAY NEPHELOMETRY NOT SPEC: CPT | Performed by: INTERNAL MEDICINE

## 2025-08-08 PROCEDURE — 76981 USE PARENCHYMA: CPT

## 2025-08-08 PROCEDURE — 76705 ECHO EXAM OF ABDOMEN: CPT

## 2025-08-08 PROCEDURE — 82977 ASSAY OF GGT: CPT | Performed by: INTERNAL MEDICINE

## 2025-08-08 PROCEDURE — 84450 TRANSFERASE (AST) (SGOT): CPT | Performed by: INTERNAL MEDICINE

## 2025-08-08 PROCEDURE — 82465 ASSAY BLD/SERUM CHOLESTEROL: CPT | Performed by: INTERNAL MEDICINE

## 2025-08-08 PROCEDURE — 84478 ASSAY OF TRIGLYCERIDES: CPT | Performed by: INTERNAL MEDICINE

## 2025-08-08 PROCEDURE — 82947 ASSAY GLUCOSE BLOOD QUANT: CPT | Performed by: INTERNAL MEDICINE

## 2025-08-08 PROCEDURE — 84460 ALANINE AMINO (ALT) (SGPT): CPT | Performed by: INTERNAL MEDICINE

## 2025-08-08 PROCEDURE — 82172 ASSAY OF APOLIPOPROTEIN: CPT | Performed by: INTERNAL MEDICINE

## 2025-08-08 PROCEDURE — 36415 COLL VENOUS BLD VENIPUNCTURE: CPT | Performed by: INTERNAL MEDICINE

## 2025-08-08 PROCEDURE — 82247 BILIRUBIN TOTAL: CPT | Performed by: INTERNAL MEDICINE

## 2025-08-08 PROCEDURE — 83010 ASSAY OF HAPTOGLOBIN QUANT: CPT | Performed by: INTERNAL MEDICINE

## 2025-08-13 LAB
A2 MACROGLOB SERPL-MCNC: 246 MG/DL (ref 110–276)
ALT SERPL W P-5'-P-CCNC: 34 IU/L (ref 0–55)
APO A-I SERPL-MCNC: 182 MG/DL (ref 101–178)
AST SERPL W P-5'-P-CCNC: 70 IU/L (ref 0–40)
BILIRUB SERPL-MCNC: <0.2 MG/DL (ref 0–1.2)
CHOLEST SERPL-MCNC: 204 MG/DL (ref 100–199)
FIBROSIS SCORING:: ABNORMAL
FIBROSIS STAGE SERPL QL: ABNORMAL
GGT SERPL-CCNC: 23 IU/L (ref 0–65)
GLUCOSE SERPL-MCNC: 100 MG/DL (ref 70–99)
HAPTOGLOB SERPL-MCNC: 78 MG/DL (ref 17–317)
LABORATORY COMMENT REPORT: ABNORMAL
LIVER FIBR SCORE SERPL CALC.FIBROSURE: 0.1 (ref 0–0.21)
LIVER STEATOSIS GRADE SERPL QL: ABNORMAL
LIVER STEATOSIS SCORE SERPL: 0.15 (ref 0–0.4)
NASH GRADE SERPL QL: ABNORMAL
NASH INTERPRETATION SERPL-IMP: ABNORMAL
NASH SCORE SERPL: 0 (ref 0–0.25)
NASH SCORING: ABNORMAL
STEATOSIS SCORING: ABNORMAL
TEST PERFORMANCE INFO SPEC: ABNORMAL
TEST PERFORMANCE INFO SPEC: ABNORMAL
TRIGL SERPL-MCNC: 143 MG/DL (ref 0–149)

## 2025-08-15 ENCOUNTER — TELEPHONE (OUTPATIENT)
Dept: INTERNAL MEDICINE | Age: 35
End: 2025-08-15
Payer: MEDICAID